# Patient Record
Sex: FEMALE | Race: WHITE | NOT HISPANIC OR LATINO | Employment: FULL TIME | URBAN - NONMETROPOLITAN AREA
[De-identification: names, ages, dates, MRNs, and addresses within clinical notes are randomized per-mention and may not be internally consistent; named-entity substitution may affect disease eponyms.]

---

## 2020-10-24 ENCOUNTER — APPOINTMENT (OUTPATIENT)
Dept: GENERAL RADIOLOGY | Facility: HOSPITAL | Age: 25
End: 2020-10-24

## 2020-10-24 ENCOUNTER — HOSPITAL ENCOUNTER (EMERGENCY)
Facility: HOSPITAL | Age: 25
Discharge: HOME | End: 2020-10-24
Attending: FAMILY MEDICINE | Admitting: STUDENT IN AN ORGANIZED HEALTH CARE EDUCATION/TRAINING PROGRAM

## 2020-10-24 VITALS
OXYGEN SATURATION: 98 % | HEART RATE: 101 BPM | BODY MASS INDEX: 31.5 KG/M2 | DIASTOLIC BLOOD PRESSURE: 71 MMHG | SYSTOLIC BLOOD PRESSURE: 115 MMHG | HEIGHT: 70 IN | RESPIRATION RATE: 18 BRPM | TEMPERATURE: 98.2 F | WEIGHT: 220 LBS

## 2020-10-24 DIAGNOSIS — J02.9 SORE THROAT: Primary | ICD-10-CM

## 2020-10-24 DIAGNOSIS — B34.9 VIRAL SYNDROME: ICD-10-CM

## 2020-10-24 LAB
ALBUMIN SERPL-MCNC: 4.76 G/DL (ref 3.5–5.2)
ALBUMIN/GLOB SERPL: 1.7 G/DL
ALP SERPL-CCNC: 103 U/L (ref 39–117)
ALT SERPL W P-5'-P-CCNC: 17 U/L (ref 1–33)
ANION GAP SERPL CALCULATED.3IONS-SCNC: 13.5 MMOL/L (ref 5–15)
AST SERPL-CCNC: 15 U/L (ref 1–32)
B PARAPERT DNA SPEC QL NAA+PROBE: NOT DETECTED
B PERT DNA SPEC QL NAA+PROBE: NOT DETECTED
B-HCG UR QL: NEGATIVE
BACTERIA UR QL AUTO: ABNORMAL /HPF
BASOPHILS # BLD AUTO: 0.06 10*3/MM3 (ref 0–0.2)
BASOPHILS NFR BLD AUTO: 0.5 % (ref 0–1.5)
BILIRUB SERPL-MCNC: 0.4 MG/DL (ref 0–1.2)
BILIRUB UR QL STRIP: NEGATIVE
BUN SERPL-MCNC: 9 MG/DL (ref 6–20)
BUN/CREAT SERPL: 13.8 (ref 7–25)
C PNEUM DNA NPH QL NAA+NON-PROBE: NOT DETECTED
CALCIUM SPEC-SCNC: 9.6 MG/DL (ref 8.6–10.5)
CHLORIDE SERPL-SCNC: 101 MMOL/L (ref 98–107)
CLARITY UR: CLEAR
CO2 SERPL-SCNC: 24.5 MMOL/L (ref 22–29)
COLOR UR: YELLOW
CREAT SERPL-MCNC: 0.65 MG/DL (ref 0.57–1)
DEPRECATED RDW RBC AUTO: 44.4 FL (ref 37–54)
EOSINOPHIL # BLD AUTO: 0.08 10*3/MM3 (ref 0–0.4)
EOSINOPHIL NFR BLD AUTO: 0.7 % (ref 0.3–6.2)
ERYTHROCYTE [DISTWIDTH] IN BLOOD BY AUTOMATED COUNT: 13.9 % (ref 12.3–15.4)
FLUAV H1 2009 PAND RNA NPH QL NAA+PROBE: NOT DETECTED
FLUAV H1 HA GENE NPH QL NAA+PROBE: NOT DETECTED
FLUAV H3 RNA NPH QL NAA+PROBE: NOT DETECTED
FLUAV SUBTYP SPEC NAA+PROBE: NOT DETECTED
FLUBV RNA ISLT QL NAA+PROBE: NOT DETECTED
GFR SERPL CREATININE-BSD FRML MDRD: 113 ML/MIN/1.73
GLOBULIN UR ELPH-MCNC: 2.7 GM/DL
GLUCOSE SERPL-MCNC: 86 MG/DL (ref 65–99)
GLUCOSE UR STRIP-MCNC: NEGATIVE MG/DL
HADV DNA SPEC NAA+PROBE: NOT DETECTED
HCOV 229E RNA SPEC QL NAA+PROBE: NOT DETECTED
HCOV HKU1 RNA SPEC QL NAA+PROBE: NOT DETECTED
HCOV NL63 RNA SPEC QL NAA+PROBE: NOT DETECTED
HCOV OC43 RNA SPEC QL NAA+PROBE: NOT DETECTED
HCT VFR BLD AUTO: 40.5 % (ref 34–46.6)
HGB BLD-MCNC: 12.7 G/DL (ref 12–15.9)
HGB UR QL STRIP.AUTO: NEGATIVE
HMPV RNA NPH QL NAA+NON-PROBE: NOT DETECTED
HPIV1 RNA SPEC QL NAA+PROBE: NOT DETECTED
HPIV2 RNA SPEC QL NAA+PROBE: NOT DETECTED
HPIV3 RNA NPH QL NAA+PROBE: NOT DETECTED
HPIV4 P GENE NPH QL NAA+PROBE: NOT DETECTED
HYALINE CASTS UR QL AUTO: ABNORMAL /LPF
IMM GRANULOCYTES # BLD AUTO: 0.05 10*3/MM3 (ref 0–0.05)
IMM GRANULOCYTES NFR BLD AUTO: 0.4 % (ref 0–0.5)
KETONES UR QL STRIP: NEGATIVE
LEUKOCYTE ESTERASE UR QL STRIP.AUTO: ABNORMAL
LYMPHOCYTES # BLD AUTO: 2.18 10*3/MM3 (ref 0.7–3.1)
LYMPHOCYTES NFR BLD AUTO: 19.3 % (ref 19.6–45.3)
M PNEUMO IGG SER IA-ACNC: NOT DETECTED
MCH RBC QN AUTO: 27.6 PG (ref 26.6–33)
MCHC RBC AUTO-ENTMCNC: 31.4 G/DL (ref 31.5–35.7)
MCV RBC AUTO: 88 FL (ref 79–97)
MONOCYTES # BLD AUTO: 0.89 10*3/MM3 (ref 0.1–0.9)
MONOCYTES NFR BLD AUTO: 7.9 % (ref 5–12)
NEUTROPHILS NFR BLD AUTO: 71.2 % (ref 42.7–76)
NEUTROPHILS NFR BLD AUTO: 8.03 10*3/MM3 (ref 1.7–7)
NITRITE UR QL STRIP: NEGATIVE
NRBC BLD AUTO-RTO: 0 /100 WBC (ref 0–0.2)
PH UR STRIP.AUTO: 5.5 [PH] (ref 5–8)
PLATELET # BLD AUTO: 322 10*3/MM3 (ref 140–450)
PMV BLD AUTO: 10.3 FL (ref 6–12)
POTASSIUM SERPL-SCNC: 3.5 MMOL/L (ref 3.5–5.2)
PROT SERPL-MCNC: 7.5 G/DL (ref 6–8.5)
PROT UR QL STRIP: NEGATIVE
RBC # BLD AUTO: 4.6 10*6/MM3 (ref 3.77–5.28)
RBC # UR: ABNORMAL /HPF
REF LAB TEST METHOD: ABNORMAL
RHINOVIRUS RNA SPEC NAA+PROBE: NOT DETECTED
RSV RNA NPH QL NAA+NON-PROBE: NOT DETECTED
S PYO AG THROAT QL: NEGATIVE
SARS-COV-2 RNA RESP QL NAA+PROBE: NOT DETECTED
SODIUM SERPL-SCNC: 139 MMOL/L (ref 136–145)
SP GR UR STRIP: 1.02 (ref 1–1.03)
SQUAMOUS #/AREA URNS HPF: ABNORMAL /HPF
UROBILINOGEN UR QL STRIP: ABNORMAL
WBC # BLD AUTO: 11.29 10*3/MM3 (ref 3.4–10.8)
WBC UR QL AUTO: ABNORMAL /HPF

## 2020-10-24 PROCEDURE — 99283 EMERGENCY DEPT VISIT LOW MDM: CPT

## 2020-10-24 PROCEDURE — 71045 X-RAY EXAM CHEST 1 VIEW: CPT

## 2020-10-24 PROCEDURE — 81025 URINE PREGNANCY TEST: CPT | Performed by: FAMILY MEDICINE

## 2020-10-24 PROCEDURE — 81001 URINALYSIS AUTO W/SCOPE: CPT | Performed by: FAMILY MEDICINE

## 2020-10-24 PROCEDURE — 80053 COMPREHEN METABOLIC PANEL: CPT | Performed by: FAMILY MEDICINE

## 2020-10-24 PROCEDURE — 87635 SARS-COV-2 COVID-19 AMP PRB: CPT | Performed by: FAMILY MEDICINE

## 2020-10-24 PROCEDURE — 87081 CULTURE SCREEN ONLY: CPT | Performed by: FAMILY MEDICINE

## 2020-10-24 PROCEDURE — 85025 COMPLETE CBC W/AUTO DIFF WBC: CPT | Performed by: FAMILY MEDICINE

## 2020-10-24 PROCEDURE — 0100U HC BIOFIRE FILMARRAY RESP PANEL 2: CPT | Performed by: FAMILY MEDICINE

## 2020-10-24 PROCEDURE — 87880 STREP A ASSAY W/OPTIC: CPT | Performed by: FAMILY MEDICINE

## 2020-10-24 RX ORDER — ACETAMINOPHEN 500 MG
1000 TABLET ORAL ONCE
Status: COMPLETED | OUTPATIENT
Start: 2020-10-24 | End: 2020-10-24

## 2020-10-24 RX ADMIN — ACETAMINOPHEN 1000 MG: 500 TABLET ORAL at 18:48

## 2020-10-24 NOTE — ED NOTES
Introduced myself to pt at this time. Pt is resting comfortably on ED stretcher. Pt is updated on plan for discharge. Pt is alert and oriented, respirations even and unlabored, NADN. Call light in reach, will continue to monitor      Ny, ARIS Mcknight  10/24/20 1945

## 2020-10-24 NOTE — ED PROVIDER NOTES
Subjective   Patient is a 25-year-old female presents emerged department complaining of sore throat and coughing for the past 2 or 3 days.  The patient states that she checked her throat with a light in the mirror and noticed some white blisters on her tonsils and in the back of her throat.  The patient denies any fever and states that she has been checking her temperature.  She has had chills, and coughing.  She states her cough has been junky.  She denies any wheezing, shortness of breath, but does mention that she is had a lot of nasal congestion.  The patient states that she has not had any nausea or vomiting.  She denies any abdominal pain or diarrhea.  She denies any rash.  The patient has no other complaints at this time.  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          Review of Systems   Constitutional: Positive for chills. Negative for activity change, appetite change, diaphoresis, fatigue and fever.   HENT: Positive for congestion, postnasal drip, rhinorrhea and sore throat. Negative for sinus pressure, sinus pain, sneezing and tinnitus.    Eyes: Negative for discharge and itching.   Respiratory: Positive for cough. Negative for apnea, choking, chest tightness, shortness of breath, wheezing and stridor.    Cardiovascular: Negative for chest pain, palpitations and leg swelling.   Gastrointestinal: Negative for abdominal distention, abdominal pain, diarrhea, nausea and vomiting.   Genitourinary: Negative for difficulty urinating and flank pain.   Musculoskeletal: Negative for arthralgias and back pain.   Neurological: Negative for dizziness and light-headedness.   Psychiatric/Behavioral: Negative for agitation and confusion.       No past medical history on file.    No Known Allergies    No past surgical history on file.    No family history on file.    Social History     Socioeconomic History   • Marital status: Single     Spouse name: Not on file   • Number of children: Not on file    • Years of education: Not on file   • Highest education level: Not on file           Objective   Physical Exam  Vitals signs and nursing note reviewed.   Constitutional:       General: She is not in acute distress.     Appearance: Normal appearance. She is normal weight. She is not ill-appearing, toxic-appearing or diaphoretic.   HENT:      Head: Normocephalic.      Right Ear: External ear normal. There is no impacted cerumen.      Left Ear: External ear normal. There is no impacted cerumen.      Nose: Nose normal. No congestion or rhinorrhea.      Mouth/Throat:      Mouth: Mucous membranes are moist.      Pharynx: No oropharyngeal exudate or posterior oropharyngeal erythema.   Eyes:      General: No scleral icterus.        Right eye: No discharge.         Left eye: No discharge.      Pupils: Pupils are equal, round, and reactive to light.   Neck:      Musculoskeletal: Normal range of motion. No neck rigidity or muscular tenderness.      Vascular: No carotid bruit.   Cardiovascular:      Rate and Rhythm: Normal rate and regular rhythm.      Pulses: Normal pulses.      Heart sounds: Normal heart sounds. No murmur. No friction rub. No gallop.    Pulmonary:      Effort: Pulmonary effort is normal. No respiratory distress.      Breath sounds: Normal breath sounds. No stridor. No wheezing, rhonchi or rales.   Chest:      Chest wall: No tenderness.   Abdominal:      General: Abdomen is flat. There is no distension.      Palpations: There is no mass.      Tenderness: There is no abdominal tenderness. There is no guarding or rebound.      Hernia: No hernia is present.   Lymphadenopathy:      Cervical: No cervical adenopathy.   Skin:     General: Skin is warm and dry.      Capillary Refill: Capillary refill takes less than 2 seconds.      Coloration: Skin is not jaundiced or pale.      Findings: No bruising or erythema.   Neurological:      General: No focal deficit present.      Mental Status: She is alert and oriented to  person, place, and time.   Psychiatric:         Mood and Affect: Mood normal.         Behavior: Behavior normal.         Procedures           ED Course  ED Course as of Oct 24 1926   Sat Oct 24, 2020   1819 Received patient checkout from previous team.  25-year-old female well-appearing coming in with viral-like syndrome.  Pending lab work Covid test and reassessment.  Hemodynamically stable satting well on room air.  Will give patient Tylenol for symptom relief.    [JA]   1925 Remainder of work-up unremarkable.  On reassessment patient states she felt well.  Will discharge patient home with close follow-up with primary care doctor.  Discussed return precautions and quarantine at home.  Discussed Motrin Tylenol over-the-counter for pain and symptom control.  Discussed signs return to the ER such as worsening pain sore throat or other symptoms.    [JA]      ED Course User Index  [JA] Esdon Sinclair MD                                           [facility]    Final diagnoses:   Sore throat   Viral syndrome            Edson Sinclair MD  10/24/20 1926

## 2020-10-24 NOTE — DISCHARGE INSTRUCTIONS
Call one of the offices below to establish a primary care provider.  If you are unable to get an appointment and feel it is an emergency and need to be seen immediately please return to the Emergency Department.    Call one of the office below to set up a primary care provider.    Dr. Lucas Ochoa                                                                                                       [address] [facility] [address]  [phone]    Dr. Bunch, Dr. MARYCRUZ Elmore, Dr. IRA Elmore ([facility])  [address] [facility] [address]  [phone]    Dr. Rodriguez, Dr. Melendrez, Dr. Loyd ([facility])  [address] [facility] [address]  [phone]    Dr. Dubose  [address] [facility] [address]  [phone]    Dr. Espinoza, Dr. Ba, Dr. Jeffery, Dr. Gambino ([facility])  [address] [place] [address] [address]  [facility] [address]  [phone]    Dr. Shayna Burden  [address] [facility] [place] [address]  [phone]    Dr. Aziza Shelton  [address]   [address]  [facility] [address]  [phone]    Dr. Ochoa  [address] [facility] [address]  [phone]    Dr. Rasmussen, Dr. Schultz  [address] [facility] [place] [address]  [phone]    Dr. Vaz  [address]                                                              [address]  [facility] [address]  [phone]    Dr. Ugalde  [address] [facility] [address]  [phone]    Dr. Ping Torres  [address]  [address]  [place] [address]  [phone]    Dr. Soto and [facility]   [address] [facility]  [address]  [place] [address]  [phone]

## 2020-10-26 LAB — BACTERIA SPEC AEROBE CULT: NORMAL

## 2023-04-13 ENCOUNTER — APPOINTMENT (OUTPATIENT)
Dept: GENERAL RADIOLOGY | Facility: HOSPITAL | Age: 28
End: 2023-04-13
Payer: COMMERCIAL

## 2023-04-13 ENCOUNTER — HOSPITAL ENCOUNTER (EMERGENCY)
Facility: HOSPITAL | Age: 28
Discharge: LEFT BEFORE DISCHARGE | End: 2023-04-14
Attending: STUDENT IN AN ORGANIZED HEALTH CARE EDUCATION/TRAINING PROGRAM | Admitting: STUDENT IN AN ORGANIZED HEALTH CARE EDUCATION/TRAINING PROGRAM
Payer: COMMERCIAL

## 2023-04-13 VITALS
RESPIRATION RATE: 16 BRPM | TEMPERATURE: 97.7 F | WEIGHT: 235 LBS | HEART RATE: 89 BPM | HEIGHT: 71 IN | DIASTOLIC BLOOD PRESSURE: 84 MMHG | OXYGEN SATURATION: 98 % | BODY MASS INDEX: 32.9 KG/M2 | SYSTOLIC BLOOD PRESSURE: 138 MMHG

## 2023-04-13 PROCEDURE — 99282 EMERGENCY DEPT VISIT SF MDM: CPT

## 2023-04-13 PROCEDURE — 93005 ELECTROCARDIOGRAM TRACING: CPT | Performed by: STUDENT IN AN ORGANIZED HEALTH CARE EDUCATION/TRAINING PROGRAM

## 2023-04-13 PROCEDURE — 71045 X-RAY EXAM CHEST 1 VIEW: CPT

## 2023-04-13 PROCEDURE — 93010 ELECTROCARDIOGRAM REPORT: CPT | Performed by: INTERNAL MEDICINE

## 2023-04-13 PROCEDURE — 99211 OFF/OP EST MAY X REQ PHY/QHP: CPT | Performed by: STUDENT IN AN ORGANIZED HEALTH CARE EDUCATION/TRAINING PROGRAM

## 2023-04-13 RX ORDER — SODIUM CHLORIDE 0.9 % (FLUSH) 0.9 %
10 SYRINGE (ML) INJECTION AS NEEDED
Status: DISCONTINUED | OUTPATIENT
Start: 2023-04-13 | End: 2023-04-14 | Stop reason: HOSPADM

## 2023-04-13 NOTE — Clinical Note
Called patient three times and checked all waiting areas with no response. Pt left lobby without being seen after triage.

## 2023-04-14 LAB
QT INTERVAL: 328 MS
QTC INTERVAL: 429 MS

## 2023-04-14 NOTE — ED NOTES
MEDICAL SCREENING:    Reason for Visit: 27-year-old presents with complaints of anterior chest pain.  Hemodynamically stable vitals.    Patient initially seen in triage.  The patient was advised further evaluation and diagnostic testing will be needed, some of the treatment and testing will be initiated in the lobby in order to begin the process.  The patient will be returned to the waiting area for the time being and possibly be re-assessed by a subsequent ED provider.  The patient will be brought back to the treatment area in as timely manner as possible.         Chasity Hodges DO  04/13/23 2207

## 2023-05-05 ENCOUNTER — TRANSCRIBE ORDERS (OUTPATIENT)
Dept: ADMINISTRATIVE | Facility: HOSPITAL | Age: 28
End: 2023-05-05
Payer: COMMERCIAL

## 2023-05-05 ENCOUNTER — TELEPHONE (OUTPATIENT)
Dept: CARDIOLOGY | Facility: CLINIC | Age: 28
End: 2023-05-05

## 2023-05-05 ENCOUNTER — HOSPITAL ENCOUNTER (OUTPATIENT)
Dept: RESPIRATORY THERAPY | Facility: HOSPITAL | Age: 28
Discharge: HOME | End: 2023-05-05
Payer: COMMERCIAL

## 2023-05-05 DIAGNOSIS — R07.89 OTHER CHEST PAIN: ICD-10-CM

## 2023-05-05 DIAGNOSIS — R07.89 OTHER CHEST PAIN: Primary | ICD-10-CM

## 2023-05-05 PROCEDURE — 93242 EXT ECG>48HR<7D RECORDING: CPT

## 2023-05-05 NOTE — TELEPHONE ENCOUNTER
Caller: Tyler Alexis    Relationship to patient: Self    Best call back number:     Chief complaint: PT WANTS A RESCHEDULE WITHIN THIS MONTH OR A FEW WEEKS DUE TO A CONFLICT WITH HER JOB SCHEDULE    Type of visit: NEW PATIENT    Requested date: 5.23.23    If rescheduling, when is the original appointment: 5.9.23    Additional notes: PATIENT CALLED REQUESTING A RESCHEDULE FOR HER MONITOR AS WELL AS A NEW PATIENT APPT. SHE WAS ORIGNALLY LOOKING FOR A Friday BUT ANY DAY WITHIN A FEW WEEKS IS GOOD. PT AGREED TO 5.23.23 ANY TIME, PLEASE CALL PT BACK FOR SCHEDULING

## 2023-05-23 ENCOUNTER — OFFICE VISIT (OUTPATIENT)
Dept: CARDIOLOGY | Facility: CLINIC | Age: 28
End: 2023-05-23
Payer: COMMERCIAL

## 2023-05-23 VITALS
HEIGHT: 70 IN | WEIGHT: 265 LBS | HEART RATE: 83 BPM | OXYGEN SATURATION: 98 % | SYSTOLIC BLOOD PRESSURE: 112 MMHG | BODY MASS INDEX: 37.94 KG/M2 | DIASTOLIC BLOOD PRESSURE: 74 MMHG

## 2023-05-23 DIAGNOSIS — R06.00 DYSPNEA, UNSPECIFIED TYPE: ICD-10-CM

## 2023-05-23 DIAGNOSIS — Z72.0 TOBACCO USE: ICD-10-CM

## 2023-05-23 DIAGNOSIS — R07.9 CHEST PAIN, UNSPECIFIED TYPE: Primary | ICD-10-CM

## 2023-05-23 RX ORDER — NORETHINDRONE ACETATE AND ETHINYL ESTRADIOL 1; .02 MG/1; MG/1
1 TABLET ORAL DAILY
COMMUNITY
Start: 2023-05-04

## 2023-05-23 RX ORDER — NITROGLYCERIN 0.4 MG/1
TABLET SUBLINGUAL
COMMUNITY
Start: 2023-04-17

## 2023-05-23 NOTE — LETTER
June 11, 2023     FREDY Long  [address]  [place] [address]    Patient: Tyler Alexis   YOB: 1995   Date of Visit: 5/23/2023       Dear FREDY Long,    Tyler Alexis was in my office today. Below are the relevant portions of my assessment and plan of care.           If you have questions, please do not hesitate to call me. I look forward to following Tyler along with you.         Sincerely,        FREDY Aguilar        CC: No Recipients

## 2023-05-23 NOTE — PROGRESS NOTES
Subjective     Tyler Alexis is a 27 y.o. female.   Chief Complaint   Patient presents with   • Establish Care   • Chest Pain     Left sided dull burning pain radiates up into shoulder     History of Present Illness   Tyler Alexis is a 27 y.o. female who presents to the clinic today as a new patient for cardiology.     She complains of a dull, burning chest discomfort that occurs frequently and has been going on for the last several months.  She describes the pain as an 8 out of 10.  She feels like the discomfort happens more at work and feels like it may be anxiety related.  She has a stressful job. The discomfort is generally in the center of her chest and into her shoulder.  She denies any nausea, vomiting or diaphoresis with the symptoms. She denies underlying hx of DM, HTN, or HLD. She is a smoker. She denies family hx of cardiovascular disease. She recently completed a holter monitor.     There is no problem list on file for this patient.    Past Medical History:   Diagnosis Date   • Depression    • Lab test positive for detection of COVID-19 virus 2021     Past Surgical History:   Procedure Laterality Date   • KNEE ARTHROSCOPY W/ MEDIAL COLLATERAL LIGAMENT (MCL) REPAIR Left        History reviewed. No pertinent family history.  Social History     Tobacco Use   • Smoking status: Every Day     Packs/day: 0.50     Years: 1.00     Pack years: 0.50     Types: Cigarettes   • Smokeless tobacco: Never   Vaping Use   • Vaping Use: Never used   Substance Use Topics   • Alcohol use: Yes     Comment: i drink a week   • Drug use: Never     The following portions of the patient's history were reviewed and updated as appropriate: allergies, current medications, past family history, past medical history, past social history, past surgical history and problem list.    No Known Allergies      Current Outpatient Medications:   •  nitroglycerin (NITROSTAT) 0.4 MG SL tablet, DISSOLVE ONE TABLET UNDER TONGUE AS NEEDED FOR CHEST  "PAIN. MAY REPEAT DOSE EVERY 5 MINUTES UP TO 3 DOSES. IF NO RELIEF DIAL 911, Disp: , Rfl:   •  norethindrone-ethinyl estradiol (MICROGESTIN 1/20) 1-20 MG-MCG per tablet, Take 1 tablet by mouth Daily., Disp: , Rfl:     Review of Systems   Constitutional: Negative for activity change, appetite change, chills, diaphoresis, fatigue and fever.   HENT: Negative for congestion, drooling, ear discharge, ear pain, mouth sores, nosebleeds, postnasal drip, rhinorrhea, sinus pressure, sneezing and sore throat.    Eyes: Negative for pain, discharge and visual disturbance.   Respiratory: Positive for shortness of breath. Negative for cough, chest tightness and wheezing.    Cardiovascular: Positive for chest pain. Negative for palpitations and leg swelling.   Gastrointestinal: Negative for abdominal pain, constipation, diarrhea, nausea and vomiting.   Endocrine: Negative for cold intolerance, heat intolerance, polydipsia, polyphagia and polyuria.   Musculoskeletal: Negative for arthralgias, myalgias and neck pain.   Skin: Negative for rash and wound.   Neurological: Negative for dizziness, syncope, speech difficulty, weakness, light-headedness and headaches.   Hematological: Negative for adenopathy. Does not bruise/bleed easily.   Psychiatric/Behavioral: Negative for confusion, dysphoric mood and sleep disturbance. The patient is not nervous/anxious.    All other systems reviewed and are negative.    /74 (BP Location: Left arm, Patient Position: Sitting, Cuff Size: Large Adult)   Pulse 83   Ht 177.8 cm (70\")   Wt 120 kg (265 lb)   SpO2 98%   BMI 38.02 kg/m²     Objective   No Known Allergies    Physical Exam  Vitals reviewed.   Constitutional:       Appearance: Normal appearance. She is well-developed.   HENT:      Head: Normocephalic.   Eyes:      Conjunctiva/sclera: Conjunctivae normal.   Neck:      Thyroid: No thyromegaly.      Vascular: No carotid bruit or JVD.   Cardiovascular:      Rate and Rhythm: Normal rate and " regular rhythm.   Pulmonary:      Effort: Pulmonary effort is normal.      Breath sounds: Normal breath sounds.   Musculoskeletal:      Cervical back: Neck supple.      Right lower leg: No edema.      Left lower leg: No edema.   Skin:     General: Skin is warm and dry.   Neurological:      Mental Status: She is alert and oriented to person, place, and time.   Psychiatric:         Attention and Perception: Attention normal.         Mood and Affect: Mood normal.         Speech: Speech normal.         Behavior: Behavior normal. Behavior is cooperative.         Cognition and Memory: Cognition normal.           ECG 12 Lead    Date/Time: 5/23/2023 10:47 AM  Performed by: Kori Gama APRN  Authorized by: Kori Gama APRN   Comparison: compared with previous ECG   Similar to previous ECG  Rhythm: sinus rhythm  Rate: normal  BPM: 83    Clinical impression: normal ECG  Comments: QT/QTc - 336/376            LABS  WBC   Date Value Ref Range Status   10/24/2020 11.29 (H) 3.40 - 10.80 10*3/mm3 Final     RBC   Date Value Ref Range Status   10/24/2020 4.60 3.77 - 5.28 10*6/mm3 Final     Hemoglobin   Date Value Ref Range Status   10/24/2020 12.7 12.0 - 15.9 g/dL Final     Hematocrit   Date Value Ref Range Status   10/24/2020 40.5 34.0 - 46.6 % Final     MCV   Date Value Ref Range Status   10/24/2020 88.0 79.0 - 97.0 fL Final     MCH   Date Value Ref Range Status   10/24/2020 27.6 26.6 - 33.0 pg Final     MCHC   Date Value Ref Range Status   10/24/2020 31.4 (L) 31.5 - 35.7 g/dL Final     RDW   Date Value Ref Range Status   10/24/2020 13.9 12.3 - 15.4 % Final     RDW-SD   Date Value Ref Range Status   10/24/2020 44.4 37.0 - 54.0 fl Final     MPV   Date Value Ref Range Status   10/24/2020 10.3 6.0 - 12.0 fL Final     Platelets   Date Value Ref Range Status   10/24/2020 322 140 - 450 10*3/mm3 Final     Neutrophil %   Date Value Ref Range Status   10/24/2020 71.2 42.7 - 76.0 % Final     Lymphocyte %   Date Value Ref Range  Status   10/24/2020 19.3 (L) 19.6 - 45.3 % Final     Monocyte %   Date Value Ref Range Status   10/24/2020 7.9 5.0 - 12.0 % Final     Eosinophil %   Date Value Ref Range Status   10/24/2020 0.7 0.3 - 6.2 % Final     Basophil %   Date Value Ref Range Status   10/24/2020 0.5 0.0 - 1.5 % Final     Immature Grans %   Date Value Ref Range Status   10/24/2020 0.4 0.0 - 0.5 % Final     Neutrophils, Absolute   Date Value Ref Range Status   10/24/2020 8.03 (H) 1.70 - 7.00 10*3/mm3 Final     Lymphocytes, Absolute   Date Value Ref Range Status   10/24/2020 2.18 0.70 - 3.10 10*3/mm3 Final     Monocytes, Absolute   Date Value Ref Range Status   10/24/2020 0.89 0.10 - 0.90 10*3/mm3 Final     Eosinophils, Absolute   Date Value Ref Range Status   10/24/2020 0.08 0.00 - 0.40 10*3/mm3 Final     Basophils, Absolute   Date Value Ref Range Status   10/24/2020 0.06 0.00 - 0.20 10*3/mm3 Final     Immature Grans, Absolute   Date Value Ref Range Status   10/24/2020 0.05 0.00 - 0.05 10*3/mm3 Final     nRBC   Date Value Ref Range Status   10/24/2020 0.0 0.0 - 0.2 /100 WBC Final     No results found for: CHOL, TRIG, HDL, LDL      IMAGING   XR Chest 1 View    Result Date: 4/13/2023  No acute cardiopulmonary findings. Signer Name: Amor Blanca MD  Signed: 4/13/2023 10:29 PM  Workstation Name: [id number]  [facility]            Assessment & Plan   Diagnoses and all orders for this visit:    1. Chest pain, unspecified type (Primary)  -     Treadmill Stress Test; Future  -     ECG 12 Lead  EKG, reviewed and discussed, no acute changes   Further testing will be arranged     2. Dyspnea, unspecified type  -     Adult Transthoracic Echo Complete W/ Cont if Necessary Per Protocol; Future  Further testing will be arranged     3. Tobacco use  Recommend avoidance of tobacco use       Lifestyle modifications including heart healthy diet, regular exercise, maintenance of desirable body weight and avoidance of tobacco use.     Advised if  new or worsening symptoms while awaiting work up, go to ER.     Follow up in 4-6 weeks, sooner if needed.

## 2023-11-03 ENCOUNTER — HOSPITAL ENCOUNTER (OUTPATIENT)
Dept: CT IMAGING | Facility: HOSPITAL | Age: 28
Discharge: HOME | End: 2023-11-03
Admitting: NURSE PRACTITIONER
Payer: COMMERCIAL

## 2023-11-03 ENCOUNTER — TRANSCRIBE ORDERS (OUTPATIENT)
Dept: ADMINISTRATIVE | Facility: HOSPITAL | Age: 28
End: 2023-11-03
Payer: COMMERCIAL

## 2023-11-03 DIAGNOSIS — R10.32 LEFT LOWER QUADRANT PAIN: Primary | ICD-10-CM

## 2023-11-03 DIAGNOSIS — R10.32 LEFT LOWER QUADRANT PAIN: ICD-10-CM

## 2023-11-03 PROCEDURE — 74176 CT ABD & PELVIS W/O CONTRAST: CPT
